# Patient Record
Sex: FEMALE | Race: BLACK OR AFRICAN AMERICAN | NOT HISPANIC OR LATINO | Employment: FULL TIME | URBAN - NONMETROPOLITAN AREA
[De-identification: names, ages, dates, MRNs, and addresses within clinical notes are randomized per-mention and may not be internally consistent; named-entity substitution may affect disease eponyms.]

---

## 2019-05-02 ENCOUNTER — TRANSCRIBE ORDERS (OUTPATIENT)
Dept: PHYSICAL THERAPY | Facility: HOSPITAL | Age: 39
End: 2019-05-02

## 2019-05-02 DIAGNOSIS — M25.552 PAIN, JOINT, HIP, LEFT: ICD-10-CM

## 2019-05-02 DIAGNOSIS — M25.541 PAIN IN JOINTS OF RIGHT HAND: ICD-10-CM

## 2019-05-02 DIAGNOSIS — M25.551 PAIN, JOINT, HIP, RIGHT: Primary | ICD-10-CM

## 2019-05-02 DIAGNOSIS — M25.542 PAIN IN JOINTS OF LEFT HAND: ICD-10-CM

## 2019-05-09 ENCOUNTER — HOSPITAL ENCOUNTER (OUTPATIENT)
Dept: PHYSICAL THERAPY | Facility: HOSPITAL | Age: 39
Setting detail: THERAPIES SERIES
Discharge: HOME | End: 2019-05-09

## 2019-05-09 DIAGNOSIS — M25.551 PAIN, JOINT, HIP, RIGHT: Primary | ICD-10-CM

## 2019-05-09 DIAGNOSIS — M25.552 PAIN, JOINT, HIP, LEFT: ICD-10-CM

## 2019-05-09 DIAGNOSIS — M25.542 PAIN IN JOINTS OF LEFT HAND: ICD-10-CM

## 2019-05-09 DIAGNOSIS — M25.541 PAIN IN JOINTS OF RIGHT HAND: ICD-10-CM

## 2019-05-09 PROCEDURE — 97110 THERAPEUTIC EXERCISES: CPT | Performed by: PHYSICAL THERAPIST

## 2019-05-09 PROCEDURE — 97162 PT EVAL MOD COMPLEX 30 MIN: CPT | Performed by: PHYSICAL THERAPIST

## 2019-05-09 NOTE — THERAPY EVALUATION
Outpatient Physical Therapy Ortho Initial Evaluation  [facility]  Shefali Aparicio, PT       Patient Name: Chase Ramos  : 1980  MRN: [id number]  Today's Date: 2019      Visit Date: 2019     Pt reports 3/10 pain pre treatment, 0/10 pain post treatment  Reports N/A% of improvement.  Attended  visits.  Insurance available: 60 visits  Next MD appt: [id number] .  Recertification: 2019.    Patient Active Problem List   Diagnosis   • Tobacco dependence syndrome        Past Medical History:   Diagnosis Date   • Arthritis    • COPD (chronic obstructive pulmonary disease) (CMS/HCC)    • Hypertension    • Obesity    • Tobacco dependence syndrome         Past Surgical History:   Procedure Laterality Date   • CONTRACEPTIVE CAPSULE REMOVAL  1997   • DIAGNOSTIC LAPAROSCOPY  2011    Lower abdominal pain of uncertain etiology   • INCISION AND DRAINAGE ABSCESS  10/28/2011   • INJECTION OF MEDICATION  2013    Celestone (betamethasone) (1)      • INJECTION OF MEDICATION  2014    Toradol (1)      • PAP SMEAR  05/15/2013   • WRIST SURGERY  1995    Repair. Laceration, R arm & hand w/ laceration of the flexor carpi ulnaris, flexor digitorum sublimis to the ring & long flexor digitorum profundus to the ring, long, & small, lacerations of nerve & artery of the ulnar nerve, forearm     Medications      buPROPion SR (WELLBUTRIN SR) 150 MG 12 hr tablet     hydrochlorothiazide (HYDRODIURIL) 25 MG tablet     levonorgestrel (MIRENA) 20 MCG/24HR IUD     nicotine (NICODERM CQ) 14 MG/24HR patch    Hydrocodone, Naproxen    Allergies: Latex    Visit Dx:     ICD-10-CM ICD-9-CM   1. Pain, joint, hip, right M25.551 719.45   2. Pain, joint, hip, left M25.552 719.45   3. Pain in joints of left hand M25.542 719.44   4. Pain in joints of right hand M25.541 719.44         Patient History     Row Name 19 0800             History    Chief Complaint  Pain  (Pended)    -ER      Type of Pain  Hip pain;Hand pain  (Pended)   -ER      Date Current Problem(s) Began  --  (Pended)  hip pain about a year, hand pain: since 15y/o  -ER      Brief Description of Current Complaint  Hip pain: inflammation, pain,   (Pended)   -ER      Previous treatment for THIS PROBLEM  Surgery;Pain Management  (Pended)  Surgery on R wrist 25 years ago, taking hydrocodone, naproxe  -ER      Surgery Date:  --  (Pended)  pt was 13 y/o  -ER      Patient/Caregiver Goals  Relieve pain  (Pended)   -ER      Current Tobacco Use  yes  (Pended)   -ER      Smoking Status  current every day smoker  (Pended)   -ER      Occupation/sports/leisure activities  Occupation: [employer] as a CNA hobbies: playing tennis  (Pended)   -ER      Patient seeing anyone else for problem(s)?  Family MD  (Pended)   -ER      What clinical tests have you had for this problem?  Nerve Conduction Test;X-ray  (Pended)   -ER      Results of Clinical Tests  carpal tunnel, awaiting X-ray results  (Pended)   -ER      History of Previous Related Injuries  pt cut wrist on glass when she was young, problems w/ R hand since  (Pended)   -ER      Are you or can you be pregnant  No  (Pended)   -ER         Pain     Pain Location  Hand;Hip  (Pended)   -ER      Pain at Present  3  (Pended)  in hips  -ER      Pain at Best  2  (Pended)  2  -ER      Pain at Worst  8  (Pended)   -ER      Pain Frequency  Constant/continuous  (Pended)   -ER      Pain Description  Burning;Tingling;Numbness  (Pended)  burning in hips, numbness and tingling in hands  -ER      What Performance Factors Make the Current Problem(s) BETTER?  hot water, hydrocodone, naproxen  (Pended)   -ER      Is your sleep disturbed?  Yes  (Pended)   -ER      Is medication used to assist with sleep?  Yes  (Pended)   -ER      Total hours of sleep per night  3  (Pended)   -ER      What position do you sleep in?  Other (comment)  (Pended)  toss and turn  -ER      Difficulties at work?  lifting  (Pended)    -ER      Difficulties with ADL's?  buttoning, cutting with a knife  (Pended)   -ER      Difficulties with recreational activities?  playing tennis, hard to grab something for a long period of time  (Pended)   -ER        User Key  (r) = Recorded By, (t) = Taken By, (c) = Cosigned By    Initials Name Provider Type    ER Laverne Guthrie, PT Student PT Student        Number of days off work: N/A    PT Ortho     Row Name 05/09/19 0800       Subjective Comments    Subjective Comments  Patient wishes to learn some exercises that she can do at home and work  (Pended)   -ER       Precautions and Contraindications    Precautions/Limitations  no known precautions/limitations  (Pended)   -ER       Posture/Observations    Alignment Options  Forward head;Cervical lordosis;Thoracic kyphosis;Rounded shoulders  (Pended)   -ER    Forward Head  Mild;Increased  (Pended)   -ER    Cervical Lordosis  Mild;Decreased  (Pended)   -ER    Thoracic Kyphosis  Mild;Increased  (Pended)   -ER    Rounded Shoulders  Bilateral:;Mild;Increased  (Pended)   -ER    Posture/Observations Comments  Patient presents with slightly antalgic gait. 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 Patient also has as scar on her R wrist/forearm from her previous injury/surgery. Patient appears nervous, as she was tapping her foot and chewing her gum vigorously.   (Pended)   -ER       Hip Special Tests    Trendelenberg sign (gluteus medius weakness)  Bilateral:;Negative  (Pended)   -ER    ALE (hip vs SI pathology)  Bilateral:;Negative  (Pended)   -ER    Ricarda’s test (tightness of ITB)  Bilateral:;Right:;Positive;Left:;Negative  (Pended)   -ER    Hip scour test (labral vs hip pathology)  Bilateral:;Positive  (Pended)   -ER    Piriformis test (piriformis syndrome)  Bilateral:;Positive  (Pended)   -ER       General ROM    GENERAL ROM COMMENTS  B wrist flex WNL, B finger AROM WNL except R 5th digit which remains in constant flexion  (Pended)    -ER       Right Upper Ext    Rt Wrist Extension AROM  36 degrees  (Pended)   -ER    Rt  Ulnar Deviation AROM  30 degrees  (Pended)   -ER    Rt  Radial Deviation AROM  10 degrees  (Pended)   -ER       Left Upper Ext    Lt Wrist Extension AROM  38 degrees  (Pended)   -ER    Lt  Ulnar Deviation AROM  28 degrees  (Pended)   -ER    Lt  Radial Deviation AROM  8 degrees  (Pended)   -ER       Right Lower Ext    Rt Hip Flexion AROM  114 degrees  (Pended)   -ER    Rt Hip External Rotation AROM  ~30 degrees  (Pended)   -ER    Rt Hip Internal Rotation AROM  ~10 degrees  (Pended)   -ER       Left Lower Ext    Lt Hip Flexion AROM  110 degrees  (Pended)   -ER    Lt Hip External Rotation AROM  ~35 degrees  (Pended)   -ER    Lt Hip Internal Rotation AROM  ~10 degrees  (Pended)   -ER       MMT (Manual Muscle Testing)    General MMT Comments  BLE 5/5 except 4+/5 B hip flexion which was also painful. B hand and elbow 5/5 with no increase in pain. 3+/5 4th MCP abduction  (Pended)   -ER        Strength Right    # Reps  2  (Pended)   -ER    Right  Test 1  65  (Pended)   -ER    Right  Test 2  59  (Pended)   -ER     Strength Average Right  62  (Pended)   -AJ (r) ER (t)        Strength Left    # Reps  2  (Pended)   -ER    Left  Test 1  65  (Pended)   -ER    Left  Test 2  68  (Pended)   -ER     Strength Average Left  66.5  (Pended)   -AJ (r) ER (t)       Sensation    Sensation WNL?  WNL  (Pended)   -ER    Light Touch  No apparent deficits  (Pended)   -ER    Additional Comments  Though patient has ulnar nerve denervation to R hand, patient has normal sensation on B hands  (Pended)   -ER       Lower Extremity Flexibility    Hamstrings  Bilateral:;WNL  (Pended)   -ER    ITB  Bilateral:;Mildly limited  (Pended)   -ER    LE Other Flexibility  Bilateral:;Mildly limited  (Pended)  piriformis  -ER       Pathomechanics    Upper Extremity Pathomechanics  Limited wrist extension with   (Pended)   -ER       Transfers     Comment (Transfers)  I with all transfers. Poor logroll technique and patient uses hands to push up in sit to/from stand  (Pended)   -ER       Gait/Stairs Assessment/Training    Comment (Gait/Stairs)  FWB, slightly antalgic gait secondary to hip pain  (Pended)   -ER      User Key  (r) = Recorded By, (t) = Taken By, (c) = Cosigned By    Initials Name Provider Type    Shefali Guerrero, PT Physical Therapist    ER Laverne Guthrie, PT Student PT Student                      Therapy Education  Education Details: (P) HEP: supine SLR, bridges, ITB S, wrist flexor S  Given: (P) HEP, Symptoms/condition management, Pain management  Program: (P) New  How Provided: (P) Verbal, Demonstration, Written  Provided to: (P) Patient  Level of Understanding: (P) Teach back education performed, Verbalized, Demonstrated     PT OP Goals     Row Name 05/09/19 0900          PT Short Term Goals    STG Date to Achieve  05/30/19  (Pended)   -ER     STG 1  I with HEP  (Pended)   -ER     STG 2  Patient able to tolerate 45 min of aquatic therex with no increase in pain  (Pended)   -ER     STG 3  Patient able to perform 50 marble pick ups B using thumb and 2nd digit with no increase in pain   (Pended)   -ER     STG 4  Patient able to perform B 4-way SLR x20 with no increase in pain  (Pended)   -ER     STG 5  B wrist extension AROM >=60 degrees  (Pended)   -ER        Long Term Goals    LTG Date to Achieve  06/07/19  (Pended)   -ER     LTG 1  5/5 BUE and BLE strength  (Pended)   -ER     LTG 2  Patient to report radicular s/s in B hands as come and go vs. constant  (Pended)   -ER     LTG 3  Patient able to perform 20 sit to stands with no UE assist and no increase in pain  (Pended)   -ER     LTG 4  Patient to use green puddy for 5 minutes B with no increase in pain  (Pended)   -ER     LTG 5  Patient able to lift 20lb from ground to waist level using proper technique with no increase in B hand or hip pain  (Pended)   -ER     LTG 6  Patient able  "to ambulate up/down 3 reciprocal stairs x10 with no UE assist and no increase in pain  (Pended)   -ER     LTG 7  I with final HEP  (Pended)   -ER     LTG 8  D/C with final HEP and free 30-day fitness [employer] membership  (Pended)   -ER        Time Calculation    PT Goal Re-Cert Due Date  05/30/19  (Pended)   -ER       User Key  (r) = Recorded By, (t) = Taken By, (c) = Cosigned By    Initials Name Provider Type    ER Laverne Guthrie, PT Student PT Student          PT Assessment/Plan     Row Name 05/09/19 0900          PT Assessment    Functional Limitations  Impaired gait;Impaired locomotion;Limitation in home management;Limitations in community activities;Performance in leisure activities;Performance in self-care ADL;Performance in work activities  (Pended)   -ER     Impairments  Impaired flexibility;Impaired muscle endurance;Impaired muscle length;Impaired muscle power;Peripheral nerve integrity;Joint integrity;Joint mobility;Motor function;Muscle strength;Pain;Poor body mechanics;Posture;Range of motion  (Pended)   -ER     Assessment Comments  Patient tolerated tx well, but c/o of \"burning\" with SLR. Patient would benefit from pool therapy to relieve B hip pain and improve strength and endurance. Patient had limited wrist extension bilaterally but other wrist AROM and  strength were WNL. Patient has ulnar claw on R hand from an injury 25 years ago. Patient has impaired motor innervation to ulnar nerve distribution on R hand which will not benefit from therapy. Patient will however benefit from overall wrist ROM and stretching exercises as well as improved dexterity  (Pended)   -ER     Rehab Potential  Good  (Pended)   -ER     Patient/caregiver participated in establishment of treatment plan and goals  Yes  (Pended)   -ER     Patient would benefit from skilled therapy intervention  Yes  (Pended)   -ER        PT Plan    PT Frequency  2x/week  (Pended)  1 land/1 pool  -ER     Predicted Duration of Therapy Intervention " (Therapy Eval)  3-4 weeks  (Pended)   -ER     Planned CPT's?  PT EVAL MOD COMPLELITY: 88765;PT RE-EVAL: 12081;PT THER PROC EA 15 MIN: 24403;PT THER ACT EA 15 MIN: 29027;PT MANUAL THERAPY EA 15 MIN: 57789;PT NEUROMUSC RE-EDUCATION EA 15 MIN: 94067;PT GAIT TRAINING EA 15 MIN: 65333;PT SELF CARE/HOME MGMT/TRAIN EA 15: 63075;PT ELECTRICAL STIM UNATTEND: ;PT THER SUPP EA 15 MIN;PT AQUATIC THERAPY EA 15 MIN: 48670  (Pended)   -ER     Physical Therapy Interventions (Optional Details)  aquatics exercise;fine motor skills;gross motor skills;home exercise program;joint mobilization;manual therapy techniques;modalities;patient/family education;ROM (Range of Motion);strengthening;stretching  (Pended)   -ER     PT Plan Comments  Add therapuddy to wrist circles to HEP; establish aquatics program  (Pended)   -ER       User Key  (r) = Recorded By, (t) = Taken By, (c) = Cosigned By    Initials Name Provider Type    ER Laverne Guthrie, PT Student PT Student         Other therapeutic activities and/or exercises will be prescribed depending on the patients progress or lack there of.    Barriers to Rehab: None noted.  Include significant or possible arthritic/degenerative changes that have occurred within the joint  The chronicity of this issue  The patient's obesity  The patient's generally deconditioned state      Safety Issues:   Latex allergy      Modalities     Row Name 05/09/19 0800             Moist Heat    MH Applied  No  (Pended)   -ER         Ice    Ice Applied  No  (Pended)   -ER        User Key  (r) = Recorded By, (t) = Taken By, (c) = Cosigned By    Initials Name Provider Type    Laverne Oliveira, PT Student PT Student        Exercises     Row Name 05/09/19 0800             Subjective Comments    Subjective Comments  Patient wishes to learn some exercises that she can do at home and work  (Pended)   -ER         Subjective Pain    Able to rate subjective pain?  yes  (Pended)   -ER      Pre-Treatment Pain Level  3  (Pended)    -ER      Post-Treatment Pain Level  0  (Pended)   -ER         Exercise 1    Exercise Name 1  B Supine SLR  (Pended)   -ER      Sets 1  2  (Pended)   -ER      Reps 1  10  (Pended)   -ER         Exercise 2    Exercise Name 2  Bridges  (Pended)   -ER      Sets 2  2  (Pended)   -ER      Reps 2  10  (Pended)   -ER      Time 2  5 sec hold  (Pended)   -ER         Exercise 3    Exercise Name 3  B wrist flexor S  (Pended)   -ER      Reps 3  2  (Pended)   -ER      Time 3  30 seconds  (Pended)   -ER         Exercise 4    Exercise Name 4  B St. ITB S  (Pended)   -ER      Reps 4  2  (Pended)   -ER      Time 4  30 seconds  (Pended)   -ER        User Key  (r) = Recorded By, (t) = Taken By, (c) = Cosigned By    Initials Name Provider Type    ER Laverne Guthrie, PT Student PT Student                        Outcome Measure Options: (P) Quick DASH, Lower Extremity Functional Scale (LEFS)  Quick DASH  Open a tight or new jar.: (P) Unable  Do heavy household chores (e.g., wash walls, wash floors): (P) Moderate Difficulty  Carry a shopping bag or briefcase: (P) Mild Difficulty  Wash your back: (P) Severe Difficulty  Use a knife to cut food: (P) Severe Difficulty  Recreational activities in which you take some force or impact through your arm, should or hand (e.g. golf, hammering, tennis, etc.): (P) No Difficulty  During the past week, to what extent has your arm, shoulder, or hand problem interfered with your normal social activites with family, friends, neighbors or groups?: (P) Moderately  During the past week, were you limited in your work or other regular daily activities as a result of your arm, shoulder or hand problem?: (P) Very limited  Arm, Shoulder, or hand pain: (P) Severe  Tingling (pins and needles) in your arm, shoulder, or hand: (P) Extreme  During the past week, how much difficulty have you had sleeping because of the pain in your arm, shoulder or hand?: (P) So much Difficulty that I can't sleep  Number of Questions Answered:  (P) 11  Quick DASH Score: (P) 65.91  Lower Extremity Functional Index  Any of your usual work, housework or school activities: (P) Quite a bit of difficulty  Your usual hobbies, recreational or sporting activities: (P) Extreme difficulty or unable to perform activity  Getting into or out of the bath: (P) Extreme difficulty or unable to perform activity  Walking between rooms: (P) Quite a bit of difficulty  Putting on your shoes or socks: (P) Quite a bit of difficulty  Squatting: (P) Quite a bit of difficulty  Lifting an object, like a bag of groceries from the floor: (P) No difficulty  Performing light activities around your home: (P) Quite a bit of difficulty  Performing heavy activities around your home: (P) Extreme difficulty or unable to perform activity  Getting into or out of a car: (P) Quite a bit of difficulty  Walking 2 blocks: (P) No difficulty  Walking a mile: (P) No difficulty  Going up or down 10 stairs (about 1 flight of stairs): (P) Quite a bit of difficulty  Standing for 1 hour: (P) No difficulty  Sitting for 1 hour: (P) Moderate difficulty  Running on even ground: (P) Quite a bit of difficulty  Running on uneven ground: (P) Extreme difficulty or unable to perform activity  Making sharp turns while running fast: (P) Extreme difficulty or unable to perform activity  Hopping: (P) No difficulty  Rolling over in bed: (P) No difficulty  Total: (P) 34      Time Calculation:     Start Time: 0800  Stop Time: 0846  Time Calculation (min): 46 min  Total Timed Code Minutes- PT: 8 minute(s)     Therapy Charges for Today     Code Description Service Date Service Provider Modifiers Qty    89621724495 HC PT EVAL MOD COMPLEXITY 3 5/9/2019 Shefali Aparicio, PT GP 1    70636480674 HC PT THER PROC EA 15 MIN 5/9/2019 Shefali Aparicio, PT GP 1    12232206837 HC PT THER SUPP EA 15 MIN 5/9/2019 Shefali Aparicio, PT GP 1          PT G-Codes  Outcome Measure Options: (P) Quick DASH, Lower Extremity Functional  Scale (LEFS)  Quick DASH Score: (P) 65.91  Total: (P) 34         Shefali Aparicio, PT, DPT, CSCS  5/9/2019

## 2019-05-14 ENCOUNTER — TELEPHONE (OUTPATIENT)
Dept: PHYSICAL THERAPY | Facility: HOSPITAL | Age: 39
End: 2019-05-14

## 2019-07-17 ENCOUNTER — DOCUMENTATION (OUTPATIENT)
Dept: PHYSICAL THERAPY | Facility: HOSPITAL | Age: 39
End: 2019-07-17

## 2019-07-17 DIAGNOSIS — M25.552 PAIN, JOINT, HIP, LEFT: ICD-10-CM

## 2019-07-17 DIAGNOSIS — M25.542 PAIN IN JOINTS OF LEFT HAND: ICD-10-CM

## 2019-07-17 DIAGNOSIS — M25.541 PAIN IN JOINTS OF RIGHT HAND: ICD-10-CM

## 2019-07-17 DIAGNOSIS — M25.551 PAIN, JOINT, HIP, RIGHT: Primary | ICD-10-CM
